# Patient Record
Sex: FEMALE | Race: WHITE | NOT HISPANIC OR LATINO | URBAN - METROPOLITAN AREA
[De-identification: names, ages, dates, MRNs, and addresses within clinical notes are randomized per-mention and may not be internally consistent; named-entity substitution may affect disease eponyms.]

---

## 2023-10-30 ENCOUNTER — HOSPITAL ENCOUNTER (EMERGENCY)
Facility: HOSPITAL | Age: 23
Discharge: HOME | End: 2023-10-30
Payer: COMMERCIAL

## 2023-10-30 VITALS
BODY MASS INDEX: 19.31 KG/M2 | RESPIRATION RATE: 20 BRPM | OXYGEN SATURATION: 98 % | TEMPERATURE: 98.2 F | HEIGHT: 63 IN | DIASTOLIC BLOOD PRESSURE: 67 MMHG | HEART RATE: 136 BPM | SYSTOLIC BLOOD PRESSURE: 124 MMHG | WEIGHT: 109 LBS

## 2023-10-30 LAB
ALBUMIN SERPL BCP-MCNC: 4.7 G/DL (ref 3.4–5)
ALP SERPL-CCNC: 41 U/L (ref 33–110)
ALT SERPL W P-5'-P-CCNC: 12 U/L (ref 7–45)
ANION GAP SERPL CALC-SCNC: 16 MMOL/L (ref 10–20)
AST SERPL W P-5'-P-CCNC: 18 U/L (ref 9–39)
BASOPHILS # BLD AUTO: 0.02 X10*3/UL (ref 0–0.1)
BASOPHILS NFR BLD AUTO: 0.2 %
BILIRUB SERPL-MCNC: 0.9 MG/DL (ref 0–1.2)
BUN SERPL-MCNC: 7 MG/DL (ref 6–23)
CALCIUM SERPL-MCNC: 10 MG/DL (ref 8.6–10.3)
CHLORIDE SERPL-SCNC: 101 MMOL/L (ref 98–107)
CO2 SERPL-SCNC: 25 MMOL/L (ref 21–32)
CREAT SERPL-MCNC: 0.54 MG/DL (ref 0.5–1.05)
EOSINOPHIL # BLD AUTO: 0 X10*3/UL (ref 0–0.7)
EOSINOPHIL NFR BLD AUTO: 0 %
ERYTHROCYTE [DISTWIDTH] IN BLOOD BY AUTOMATED COUNT: 13 % (ref 11.5–14.5)
GFR SERPL CREATININE-BSD FRML MDRD: >90 ML/MIN/1.73M*2
GLUCOSE SERPL-MCNC: 121 MG/DL (ref 74–99)
HCT VFR BLD AUTO: 46.4 % (ref 36–46)
HGB BLD-MCNC: 15.5 G/DL (ref 12–16)
IMM GRANULOCYTES # BLD AUTO: 0.04 X10*3/UL (ref 0–0.7)
IMM GRANULOCYTES NFR BLD AUTO: 0.4 % (ref 0–0.9)
LACTATE SERPL-SCNC: 1.4 MMOL/L (ref 0.4–2)
LIPASE SERPL-CCNC: 14 U/L (ref 9–82)
LYMPHOCYTES # BLD AUTO: 1.27 X10*3/UL (ref 1.2–4.8)
LYMPHOCYTES NFR BLD AUTO: 13.3 %
MCH RBC QN AUTO: 30.8 PG (ref 26–34)
MCHC RBC AUTO-ENTMCNC: 33.4 G/DL (ref 32–36)
MCV RBC AUTO: 92 FL (ref 80–100)
MONOCYTES # BLD AUTO: 0.61 X10*3/UL (ref 0.1–1)
MONOCYTES NFR BLD AUTO: 6.4 %
NEUTROPHILS # BLD AUTO: 7.58 X10*3/UL (ref 1.2–7.7)
NEUTROPHILS NFR BLD AUTO: 79.7 %
NRBC BLD-RTO: 0 /100 WBCS (ref 0–0)
PLATELET # BLD AUTO: 217 X10*3/UL (ref 150–450)
PMV BLD AUTO: 9.4 FL (ref 7.5–11.5)
POTASSIUM SERPL-SCNC: 3.8 MMOL/L (ref 3.5–5.3)
PROT SERPL-MCNC: 7.3 G/DL (ref 6.4–8.2)
RBC # BLD AUTO: 5.04 X10*6/UL (ref 4–5.2)
SODIUM SERPL-SCNC: 138 MMOL/L (ref 136–145)
WBC # BLD AUTO: 9.5 X10*3/UL (ref 4.4–11.3)

## 2023-10-30 PROCEDURE — 99283 EMERGENCY DEPT VISIT LOW MDM: CPT | Performed by: PHYSICIAN ASSISTANT

## 2023-10-30 PROCEDURE — 83690 ASSAY OF LIPASE: CPT | Performed by: PHYSICIAN ASSISTANT

## 2023-10-30 PROCEDURE — 36415 COLL VENOUS BLD VENIPUNCTURE: CPT | Performed by: PHYSICIAN ASSISTANT

## 2023-10-30 PROCEDURE — 85025 COMPLETE CBC W/AUTO DIFF WBC: CPT | Performed by: PHYSICIAN ASSISTANT

## 2023-10-30 PROCEDURE — 83605 ASSAY OF LACTIC ACID: CPT | Performed by: PHYSICIAN ASSISTANT

## 2023-10-30 PROCEDURE — 80053 COMPREHEN METABOLIC PANEL: CPT | Performed by: PHYSICIAN ASSISTANT

## 2023-10-30 PROCEDURE — 4500999001 HC ED NO CHARGE

## 2023-10-30 ASSESSMENT — PAIN SCALES - GENERAL: PAINLEVEL_OUTOF10: 8

## 2023-10-30 ASSESSMENT — COLUMBIA-SUICIDE SEVERITY RATING SCALE - C-SSRS
6. HAVE YOU EVER DONE ANYTHING, STARTED TO DO ANYTHING, OR PREPARED TO DO ANYTHING TO END YOUR LIFE?: NO
2. HAVE YOU ACTUALLY HAD ANY THOUGHTS OF KILLING YOURSELF?: NO
1. IN THE PAST MONTH, HAVE YOU WISHED YOU WERE DEAD OR WISHED YOU COULD GO TO SLEEP AND NOT WAKE UP?: NO

## 2023-10-30 ASSESSMENT — PAIN - FUNCTIONAL ASSESSMENT: PAIN_FUNCTIONAL_ASSESSMENT: 0-10

## 2023-10-30 NOTE — ED TRIAGE NOTES
ED TRIAGE PROVIDER NOTE      Vitals are noted she is tachycardic with a heart rate of 136.  No distress.     History of Present Illness:  This is a 23-year-old female who presents with abdominal pain.  For the past 2 days she has had sharp pains throughout her abdomen but primarily in the suprapubic region.  She does have a history of ovarian cyst.  She has had multiple episodes of nonbloody emesis.  She was able to tolerate applesauce earlier today.  She denies diarrhea or changes in bowel habits.  She has not had a fever or chills.  She also denies urinary symptoms including frequency, urgency, dysuria or hematuria.  She denies abnormal vaginal bleeding or discharge.  She denies prior abdominal surgeries with the exception of a D&C for elective .    Brief Physical: Exam is limited by the patient sitting in a chair in triage. Regular tachycardic rhythm on cardiac exam. Lungs are clear to auscultation bilaterally. Abdomen is soft, nondistended with bowel sounds present throughout.  Epigastric and diffuse lower abdominal tenderness to palpation, primarily in the suprapubic region.  No rebound or guarding.      Plan: Appropriate labs and diagnostic imaging were ordered. Care was transferred to the main ED provider at this time.    For the remainder of the patient's workup and ED course, please refer to the main ED provider note.    Disclaimer: This note was dictated by speech recognition. Minor errors in transcription may be present. Please call if questions.

## 2024-02-27 ENCOUNTER — OFFICE VISIT (OUTPATIENT)
Dept: URGENT CARE | Facility: CLINIC | Age: 24
End: 2024-02-27
Payer: COMMERCIAL

## 2024-02-27 VITALS
HEART RATE: 59 BPM | TEMPERATURE: 98.4 F | BODY MASS INDEX: 19.26 KG/M2 | DIASTOLIC BLOOD PRESSURE: 50 MMHG | SYSTOLIC BLOOD PRESSURE: 96 MMHG | HEIGHT: 65 IN | OXYGEN SATURATION: 98 % | WEIGHT: 115.6 LBS

## 2024-02-27 DIAGNOSIS — S06.0X0A CONCUSSION WITHOUT LOSS OF CONSCIOUSNESS, INITIAL ENCOUNTER: Primary | ICD-10-CM

## 2024-02-27 PROCEDURE — 99204 OFFICE O/P NEW MOD 45 MIN: CPT | Performed by: PHYSICIAN ASSISTANT

## 2024-02-27 NOTE — LETTER
February 27, 2024     Patient: Sky Scott   YOB: 2000   Date of Visit: 2/27/2024       To Whom It May Concern:    It is my medical opinion that Sky Scott should remain out of work until 03/04/2024  due to concussion symptoms. She was evaluated on 02/27/2024, and should be excused from work beginning on 2/25/24. Patient was advised to follow up with her PCP if additional time off of work is needed.    If you have any questions or concerns, please don't hesitate to call.         Sincerely,        Chey Beasley PA-C    CC: No Recipients

## 2024-02-27 NOTE — PROGRESS NOTES
"Nimisha Scott is a 24 y.o. female who presents for Head Injury.    Patient presents today with signs of a concussion after a head injury that happened on 2/25/24. Has not yet been seen for this. States that she hit her head on a door to a storage unit as it was coming down. Left a bump on her forehead. Denies any LOC. Denies amnesia before of after the incident. Reports headache, nausea, dizziness, fatigue, feeling foggy/slowed down/issues concentrating and remembering, irritability/sadness/increased emotions/nervousness, drowsiness/sleeping less than usual/sleeping more. She also needs a note for work. She has not been back to work since this happened. States that Sx are overall improving. Has basically been resting. Has not used any OTC medications.       History provided by:  Patient   used: No        BP 96/50 (BP Location: Right arm, Patient Position: Sitting, BP Cuff Size: Small adult)   Pulse 59   Temp 36.9 °C (98.4 °F) (Oral)   Ht 1.651 m (5' 5\")   Wt 52.4 kg (115 lb 9.6 oz)   SpO2 98%   BMI 19.24 kg/m²    All vitals have been reviewed and are stable.    Review of Systems   All other systems reviewed and are negative.      Objective     Physical Exam  Vitals reviewed.   Constitutional:       General: She is awake.      Appearance: Normal appearance. She is well-developed.   HENT:      Head: Normocephalic and atraumatic. No raccoon eyes or Torres's sign.      Right Ear: Tympanic membrane and ear canal normal. No hemotympanum.      Left Ear: Tympanic membrane and ear canal normal. No hemotympanum.      Nose: Nose normal. No nasal deformity.      Mouth/Throat:      Dentition: Normal dentition.   Cardiovascular:      Rate and Rhythm: Normal rate.   Pulmonary:      Effort: Pulmonary effort is normal.   Musculoskeletal:      Cervical back: Full passive range of motion without pain.      Right lower leg: No edema.      Left lower leg: No edema.   Skin:     General: Skin is " warm and dry.      Findings: No lesion or rash.   Neurological:      General: No focal deficit present.      Mental Status: She is alert and oriented to person, place, and time.      Cranial Nerves: Cranial nerves 2-12 are intact. No facial asymmetry.      Sensory: Sensation is intact.      Motor: Motor function is intact. No weakness.      Coordination: Coordination is intact. Romberg sign negative. Coordination normal. Finger-Nose-Finger Test and Heel to Shin Test normal.      Gait: Gait is intact.      Deep Tendon Reflexes: Reflexes normal.   Psychiatric:         Attention and Perception: Attention normal.         Mood and Affect: Mood and affect normal.       Sky was seen today for head injury.  Diagnoses and all orders for this visit:  Concussion without loss of consciousness, initial encounter (Primary)    Pt has no neurological deficits on exam. Because of the Sx that pt is currently experiencing, would recommend additional time off of work and cognitive rest (no phone, laptop, tablet, TV, reading, etc). Pt was advised if no improvement within a few days to follow up with a PCP, as she will need possibly additional time off of work plus the possibility of referral for post-concussive therapy. Pt advised to report to ER for any worsening symptoms immediately.     Red flag symptoms reviewed with patient and all questions answered. Patient or parent/guardian verbalized understanding and agreement with care plan as above. All in office testing reviewed with patient. If symptoms worsen or do not improve, patient is to follow up with PCP or report to the ER.